# Patient Record
Sex: FEMALE | URBAN - METROPOLITAN AREA
[De-identification: names, ages, dates, MRNs, and addresses within clinical notes are randomized per-mention and may not be internally consistent; named-entity substitution may affect disease eponyms.]

---

## 2020-06-22 ENCOUNTER — VIRTUAL VISIT (OUTPATIENT)
Dept: FAMILY MEDICINE | Facility: OTHER | Age: 50
End: 2020-06-22

## 2020-06-23 NOTE — PROGRESS NOTES
"Date: 2020 09:45:46  Clinician: Claudia Dueñas  Clinician NPI: [id number]  Patient: Portia Joe  Patient : 1970  Patient Address: [place] [address]  Patient Phone: [phone]  Visit Protocol: URI  Patient Summary:  Portia is a 50 year old ( : 1970 ) female who initiated a Visit for cold, sinus infection, or influenza. When asked the question \"Please sign me up to receive news, health information and promotions from [employer].\", Portia responded \"Yes\".    Portia states her symptoms started gradually 2-3 weeks ago. After her symptoms started, they improved and then got worse again.   Her symptoms consist of tooth pain, enlarged lymph nodes, facial pain or pressure, nasal congestion, rhinitis, ear pain, and a headache. She is experiencing difficulty breathing due to nasal congestion but she is not short of breath.   Symptom details     Nasal secretions: The color of her mucus is green.    Facial pain or pressure: The facial pain or pressure does not feel worse when bending or leaning forward.     Headache: She states the headache is severe (7-9 on a 10 point pain scale).     Tooth pain: The tooth pain is not caused by a cavity, recent dental work, or other mouth problems.      Portia denies having wheezing, nausea, ageusia, diarrhea, sore throat, malaise, myalgias, anosmia, fever, cough, vomiting, and chills. She also denies having recent facial or sinus surgery in the past 60 days and taking antibiotic medication for the symptoms.    Pertinent COVID-19 (Coronavirus) information  In the past 14 days, Portia has not worked in a congregate living setting.   She does not work or volunteer as healthcare worker or a  and does not work or volunteer in a healthcare facility.   Portia also has not lived in a congregate living setting in the past 14 days. She does not live with a healthcare worker.   Portia has not had a close contact with a laboratory-confirmed " COVID-19 patient within 14 days of symptom onset.   Pertinent medical history  Portia had 2 sinus infections within the past year.   Portia typically gets a yeast infection when she takes antibiotics. She has used fluconazole (Diflucan) to treat previous yeast infections. 1 dose of fluconazole (Diflucan) has typically been sufficient for symptoms to resolve in the past.   Portia needs a return to work/school note.   Weight: 220 lbs   Portia does not smoke or use smokeless tobacco.   She denies pregnancy and denies breastfeeding. She does not menstruate.   Weight: 220 lbs    MEDICATIONS: No current medications, ALLERGIES: NKDA  Clinician Response:  Dear Portia,  Based on the information provided, you have acute bacterial sinusitis, also known as a sinus infection. Sinus infections are caused by bacteria or a virus and symptoms are almost always identical. The difference between the 2 types of infections is timing.  Sinus infections start as viral infections and symptoms improve on their own in about 7 days. If symptoms have not improved after 7 days or have even worsened, a bacterial infection may have developed.  Medication information  I am prescribing:       Fluticasone 50 mcg/actuation nasal spray. Inhale 2 sprays in each nostril 1 time per day; after 1 week, may adjust to 1 - 2 sprays in each nostril 1 time per day. This medication takes several days to start working, so keep taking it even if it doesn't help right away. There are no refills with this prescription.      Amoxicillin-pot clavulanate 875-125 mg oral tablet. Take 1 tablet by mouth every 12 hours for 7 days. There are no refills with this prescription.     Because you usually get a yeast infection when taking antibiotics, I am also prescribing:     Fluconazole (Diflucan) 150 mg oral tablet. Take 1 tablet by mouth 1 time a day for 1 day. There are no refills with this prescription.   If you become pregnant during this course of treatment, stop taking the  medication and contact your primary care provider.  Unless you are allergic to the over-the-counter medication(s) below, I recommend using:       Acetaminophen (Tylenol or store brand) oral tablet. Take 1-2 tablets by mouth every 4-6 hours to help with the discomfort.      Ibuprofen (Advil or store brand) 200 mg oral tablet. Take 1-3 tablets (200-600 mg) by mouth every 8 hours to help with the discomfort. Make sure to take the ibuprofen with food. Do not exceed 2400 mg in 24 hours.    A decongestant such as Sudafed PE or store brand.    A sinus irrigation kit such as Sinus Rinse, Neti Pot, SinuCleanse, or store brand. Be sure to use sterile or previously boiled water to prevent unwanted infections.      Oxymetazoline (Afrin or store brand) nasal spray. Use 1 spray in each nostril 2 times a day for a maximum of 3 days. Using this medication more frequently or longer than recommended may cause nasal congestion to reoccur or worsen. Sinus pressure occurs when the tissues lining your sinuses become swollen and inflamed. Afrin nasal spray decreases the swelling to provide the quickest and most effective relief from sinus pressure. Similar to Flonase, Afrin will help reduce swelling in your sinuses. Afrin works differently than Flonase, so be sure to use both nasal sprays.      Over-the-counter medications do not require a prescription. Ask the pharmacist if you have any questions.  Self care  Steps you can take to be as comfortable as possible:     Rest.    Drink plenty of fluids.    Take a warm shower to loosen congestion    Use a cool-mist humidifier.     When to seek care  Please be seen in a clinic or urgent care if any of the following occur:     New symptoms develop, or symptoms become worse    Symptoms do not start to improve after 3 days of treatment     Call ahead before going to the clinic or urgent care.  It is possible to have an allergic reaction to an antibiotic even if you have not had one in the past. If  you notice a new rash, significant swelling, or difficulty breathing, stop taking this medication immediately and go to a clinic or urgent care.  COVID-19 (Coronavirus) General Information  Because there is currently no vaccine to prevent infection, the best way to protect yourself is to avoid being exposed to this virus. Common symptoms of COVID-19 include but are not limited to fever, cough, and shortness of breath. These symptoms appear 2-14 days after you are exposed to the virus that causes COVID-19. Click here for more information from the CDC on how to protect yourself.  If you are sick with COVID-19 or suspect you are infected with the virus that causes COVID-19, follow the steps here from the CDC to help prevent the disease from spreading to people in your home and community.  Click here for general information from the CDC on testing.  If you develop any of these emergency warning signs for COVID-19, get medical attention immediately:     Trouble breathing    Persistent pain or pressure in the chest    New confusion or inability to arouse    Bluish lips or face      Call your doctor or clinic before going in. Call 911 if you have a medical emergency and notify the  you have or think you may have COVID-19.  For more detailed and up to date information on COVID-19 (Coronavirus), please visit the CDC website.   Diagnosis: Acute bacterial sinusitis  Diagnosis ICD: J01.90  Prescription: fluticasone 50 mcg/actuation nasal spray,suspension 1 120 spray aerosol with adapter (grams), 30 days supply. Inhale 2 sprays in each nostril 1 time per day; after 1 week, may adjust to 1 - 2 sprays in each nostril 1 time per day.. Refills: 0, Refill as needed: no, Allow substitutions: yes  Prescription: amoxicillin-pot clavulanate 875-125 mg oral tablet 14 tablet, 7 days supply. Take 1 tablet by mouth every 12 hours for 7 days. Refills: 0, Refill as needed: no, Allow substitutions: yes  Prescription: fluconazole  (Diflucan) 150 mg oral tablet 1 tablet, 1 days supply. Take 1 tablet by mouth 1 time per day for 1 day. Refills: 0, Refill as needed: no, Allow substitutions: yes  Pharmacy: [facility] #[id number] - [phone] - [address] [place] [address]